# Patient Record
Sex: FEMALE | Race: WHITE | Employment: OTHER | URBAN - METROPOLITAN AREA
[De-identification: names, ages, dates, MRNs, and addresses within clinical notes are randomized per-mention and may not be internally consistent; named-entity substitution may affect disease eponyms.]

---

## 2019-03-04 ENCOUNTER — HOME HEALTH ADMISSION (OUTPATIENT)
Dept: HOME HEALTH SERVICES | Facility: HOME HEALTH | Age: 69
End: 2019-03-04

## 2019-03-05 ENCOUNTER — PATIENT OUTREACH (OUTPATIENT)
Dept: CASE MANAGEMENT | Age: 69
End: 2019-03-05

## 2019-03-05 NOTE — PROGRESS NOTES
Outreach to home number x 2 for CCM related to new referrals to [facility], [facility], DSME center, A1C >11%. No answer; left voice message requesting return call.

## 2019-03-22 ENCOUNTER — PATIENT OUTREACH (OUTPATIENT)
Dept: CASE MANAGEMENT | Age: 69
End: 2019-03-22

## 2019-03-22 ENCOUNTER — HOME HEALTH ADMISSION (OUTPATIENT)
Dept: HOME HEALTH SERVICES | Facility: HOME HEALTH | Age: 69
End: 2019-03-22
Payer: MEDICARE

## 2019-03-22 NOTE — PROGRESS NOTES
CCM outreach to home/cell number. Answered by patient, but then unable to communicate. RN could hear patient on the phone but no response to questions. Patient returned call to RN CM. Note  Utilize questions pertinent to patient Referral Source & Reason Multiple referrals Primary concerns per patient and/or pts family/caregiver Depression Recent Hospitalization(s)/ED Visits No  
Current with Home Health? 
- Agency? NO, but has order. Social Needs: - Able to afford medications? - Financial assessment? 
- Access to care? Transportation? NO concerns Nutritional Assessment - Appetite? - Obesity? 
- Failure to Thrive? - Bowels ? Appetite poor, but eating to need. Bowels no issues, diarrhea decreased lately Cognitive Assessment 
- History of dementia 
- Health literacy Health literacy concerns Mobility/Activity Assessment - Bed/chair bound? - Make use of assistive devices? - Does patient still drive? Home bound, does drive self just to provider appointments. Plan/Interventions/Education - Follow up Appointments - Referrals DSME, coordinate referrals. Note sent to PCP office to request [facility] referral be resent. Patient has phone issues, but it is working now. She will call ENT and Dietitian to schedule visits. Has Endo visit scheduled. She is home bound but able to drive self to provider appointments.

## 2019-03-22 NOTE — Clinical Note
CHIP Gambino. Birtha Generous, can you please send the referral for home health back to [facility]. They closed it when they couldn't reach her. Her phone was not working, but seems to be okay now.

## 2019-03-24 ENCOUNTER — HOME CARE VISIT (OUTPATIENT)
Dept: SCHEDULING | Facility: HOME HEALTH | Age: 69
End: 2019-03-24
Payer: MEDICARE

## 2019-03-24 VITALS
TEMPERATURE: 97.8 F | BODY MASS INDEX: 20.2 KG/M2 | DIASTOLIC BLOOD PRESSURE: 78 MMHG | OXYGEN SATURATION: 98 % | HEIGHT: 63 IN | WEIGHT: 114 LBS | HEART RATE: 67 BPM | SYSTOLIC BLOOD PRESSURE: 140 MMHG | RESPIRATION RATE: 20 BRPM

## 2019-03-24 PROCEDURE — G0299 HHS/HOSPICE OF RN EA 15 MIN: HCPCS

## 2019-03-24 PROCEDURE — 3331090001 HH PPS REVENUE CREDIT

## 2019-03-24 PROCEDURE — 400013 HH SOC

## 2019-03-24 PROCEDURE — 3331090002 HH PPS REVENUE DEBIT

## 2019-03-25 PROCEDURE — 3331090001 HH PPS REVENUE CREDIT

## 2019-03-25 PROCEDURE — 3331090002 HH PPS REVENUE DEBIT

## 2019-03-26 PROCEDURE — 3331090001 HH PPS REVENUE CREDIT

## 2019-03-26 PROCEDURE — 3331090002 HH PPS REVENUE DEBIT

## 2019-03-27 PROCEDURE — 3331090002 HH PPS REVENUE DEBIT

## 2019-03-27 PROCEDURE — 3331090001 HH PPS REVENUE CREDIT

## 2019-03-28 ENCOUNTER — HOME CARE VISIT (OUTPATIENT)
Dept: SCHEDULING | Facility: HOME HEALTH | Age: 69
End: 2019-03-28
Payer: MEDICARE

## 2019-03-28 PROCEDURE — 3331090002 HH PPS REVENUE DEBIT

## 2019-03-28 PROCEDURE — 3331090001 HH PPS REVENUE CREDIT

## 2019-03-29 PROCEDURE — 3331090001 HH PPS REVENUE CREDIT

## 2019-03-29 PROCEDURE — 3331090002 HH PPS REVENUE DEBIT

## 2019-03-30 PROCEDURE — 3331090002 HH PPS REVENUE DEBIT

## 2019-03-30 PROCEDURE — 3331090001 HH PPS REVENUE CREDIT

## 2019-03-31 PROCEDURE — 3331090001 HH PPS REVENUE CREDIT

## 2019-03-31 PROCEDURE — 3331090002 HH PPS REVENUE DEBIT

## 2019-04-01 ENCOUNTER — HOME CARE VISIT (OUTPATIENT)
Dept: SCHEDULING | Facility: HOME HEALTH | Age: 69
End: 2019-04-01
Payer: MEDICARE

## 2019-04-01 ENCOUNTER — PATIENT OUTREACH (OUTPATIENT)
Dept: CASE MANAGEMENT | Age: 69
End: 2019-04-01

## 2019-04-01 VITALS
HEART RATE: 73 BPM | TEMPERATURE: 97.7 F | OXYGEN SATURATION: 98 % | RESPIRATION RATE: 20 BRPM | SYSTOLIC BLOOD PRESSURE: 130 MMHG | DIASTOLIC BLOOD PRESSURE: 80 MMHG

## 2019-04-01 PROCEDURE — G0299 HHS/HOSPICE OF RN EA 15 MIN: HCPCS

## 2019-04-01 PROCEDURE — 3331090002 HH PPS REVENUE DEBIT

## 2019-04-01 PROCEDURE — 3331090001 HH PPS REVENUE CREDIT

## 2019-04-01 PROCEDURE — 3331090003 HH PPS REVENUE ADJ

## 2019-04-01 NOTE — PROGRESS NOTES
Attempted CCM outreach; phone went to message \"voice mailbox not set up. \"  was also unable to reach patient for last scheduled visit. Admission visit was completed.

## 2019-04-02 PROCEDURE — 3331090001 HH PPS REVENUE CREDIT

## 2019-04-02 PROCEDURE — 3331090002 HH PPS REVENUE DEBIT

## 2019-04-03 PROCEDURE — 3331090002 HH PPS REVENUE DEBIT

## 2019-04-03 PROCEDURE — 3331090001 HH PPS REVENUE CREDIT

## 2019-04-04 ENCOUNTER — HOME CARE VISIT (OUTPATIENT)
Dept: SCHEDULING | Facility: HOME HEALTH | Age: 69
End: 2019-04-04
Payer: MEDICARE

## 2019-04-04 PROCEDURE — 3331090001 HH PPS REVENUE CREDIT

## 2019-04-04 PROCEDURE — 3331090002 HH PPS REVENUE DEBIT

## 2019-04-05 ENCOUNTER — PATIENT OUTREACH (OUTPATIENT)
Dept: CASE MANAGEMENT | Age: 69
End: 2019-04-05

## 2019-04-05 PROCEDURE — 3331090002 HH PPS REVENUE DEBIT

## 2019-04-05 PROCEDURE — 3331090001 HH PPS REVENUE CREDIT

## 2019-04-06 PROCEDURE — 3331090002 HH PPS REVENUE DEBIT

## 2019-04-06 PROCEDURE — 3331090001 HH PPS REVENUE CREDIT

## 2019-04-07 PROCEDURE — 3331090002 HH PPS REVENUE DEBIT

## 2019-04-07 PROCEDURE — 3331090001 HH PPS REVENUE CREDIT

## 2019-04-08 PROCEDURE — 3331090002 HH PPS REVENUE DEBIT

## 2019-04-08 PROCEDURE — 3331090001 HH PPS REVENUE CREDIT

## 2019-04-09 ENCOUNTER — HOME CARE VISIT (OUTPATIENT)
Dept: HOME HEALTH SERVICES | Facility: HOME HEALTH | Age: 69
End: 2019-04-09
Payer: MEDICARE

## 2019-04-09 PROCEDURE — 3331090001 HH PPS REVENUE CREDIT

## 2019-04-09 PROCEDURE — 3331090002 HH PPS REVENUE DEBIT

## 2019-04-10 PROCEDURE — 3331090001 HH PPS REVENUE CREDIT

## 2019-04-10 PROCEDURE — 3331090002 HH PPS REVENUE DEBIT

## 2019-04-11 ENCOUNTER — HOME CARE VISIT (OUTPATIENT)
Dept: SCHEDULING | Facility: HOME HEALTH | Age: 69
End: 2019-04-11

## 2019-04-11 ENCOUNTER — HOME CARE VISIT (OUTPATIENT)
Dept: HOME HEALTH SERVICES | Facility: HOME HEALTH | Age: 69
End: 2019-04-11
Payer: MEDICARE

## 2019-04-11 PROCEDURE — 3331090001 HH PPS REVENUE CREDIT

## 2019-04-11 PROCEDURE — 3331090002 HH PPS REVENUE DEBIT

## 2019-04-12 ENCOUNTER — HOME CARE VISIT (OUTPATIENT)
Dept: HOME HEALTH SERVICES | Facility: HOME HEALTH | Age: 69
End: 2019-04-12
Payer: MEDICARE

## 2019-04-19 ENCOUNTER — PATIENT OUTREACH (OUTPATIENT)
Dept: CASE MANAGEMENT | Age: 69
End: 2019-04-19

## 2019-04-19 NOTE — PROGRESS NOTES
Outreach to cell number, answered by Mrs. Tanika Shepherd. She reports she has a new phone but is still having problems with receiving calls. 1. HH has discharged. 2. New PCP: she has decided to use Dr. Stefano Maldonado. Jessica Mckeon. She will call to make an appointment. 3. She picked up a new glucometer, but instead of getting low cost Relion, she got Contour. She is hesitant to purchase more strips. She does have part D coverage but is having difficulty getting her new plan to cover. 4. She agrees to SMBG fasting and hs, which is an increase from every other day, which she was doing. 5. Given directions to her Endo appointment scheduled for 4/22. 6. She still needs to schedule appointments with dietitian and ENT. Will work on after endo and new PCP visits. 7. Long discussion on grief post 's death (one year ago). Encouraged seeing grief counselor or Mental health. She will think it over. Plan: 
Continue to follow for CCM. Coordinate referrals as above. DSME.

## 2019-04-26 ENCOUNTER — PATIENT OUTREACH (OUTPATIENT)
Dept: CASE MANAGEMENT | Age: 69
End: 2019-04-26

## 2019-04-26 NOTE — PROGRESS NOTES
CCM outreach; no answer. No endo note from scheduled visit for 4/22. Plan to attempt outreach one week.

## 2019-05-07 ENCOUNTER — HOME CARE VISIT (OUTPATIENT)
Dept: HOME HEALTH SERVICES | Facility: HOME HEALTH | Age: 69
End: 2019-05-07
Payer: MEDICARE

## 2019-05-07 ENCOUNTER — PATIENT OUTREACH (OUTPATIENT)
Dept: CASE MANAGEMENT | Age: 69
End: 2019-05-07

## 2019-05-14 ENCOUNTER — PATIENT OUTREACH (OUTPATIENT)
Dept: CASE MANAGEMENT | Age: 69
End: 2019-05-14

## 2019-05-21 ENCOUNTER — PATIENT OUTREACH (OUTPATIENT)
Dept: CASE MANAGEMENT | Age: 69
End: 2019-05-21

## 2019-05-21 NOTE — PROGRESS NOTES
Outreach to home/cell; no answer. Ms. Azeem Putnam has scheduled an appointment for 5/28 with new PCP. Plan: f/u after PCP appointment to see if referrals made. D/C possible.

## 2019-05-29 PROBLEM — F32.A DEPRESSION: Status: ACTIVE | Noted: 2019-05-29

## 2019-06-03 ENCOUNTER — PATIENT OUTREACH (OUTPATIENT)
Dept: CASE MANAGEMENT | Age: 69
End: 2019-06-03

## 2019-06-03 NOTE — PROGRESS NOTES
CCM outreach; no answer. Ms. Jason Medel has been difficult to reach. She did attend her new PCP scheduled visit. She also has a referral to endo. CCM episode closed due to inability to reach patient for engagement.

## 2019-07-23 ENCOUNTER — HOSPITAL ENCOUNTER (OUTPATIENT)
Dept: GENERAL RADIOLOGY | Age: 69
Discharge: HOME | End: 2019-07-23
Attending: INTERNAL MEDICINE
Payer: MEDICARE

## 2019-07-23 DIAGNOSIS — R11.2 NAUSEA AND VOMITING: ICD-10-CM

## 2019-07-23 DIAGNOSIS — K21.9 GERD (GASTROESOPHAGEAL REFLUX DISEASE): ICD-10-CM

## 2019-07-23 DIAGNOSIS — R13.10 DYSPHAGIA: ICD-10-CM

## 2019-07-23 PROCEDURE — 74011000255 HC RX REV CODE- 255: Performed by: INTERNAL MEDICINE

## 2019-07-23 PROCEDURE — 74011000250 HC RX REV CODE- 250: Performed by: INTERNAL MEDICINE

## 2019-07-23 PROCEDURE — 74220 X-RAY XM ESOPHAGUS 1CNTRST: CPT

## 2019-07-23 RX ADMIN — BARIUM SULFATE 135 ML: 980 POWDER, FOR SUSPENSION ORAL at 11:06

## 2019-07-23 RX ADMIN — BARIUM SULFATE 355 ML: 0.6 SUSPENSION ORAL at 11:12

## 2019-07-23 RX ADMIN — ANTACID/ANTIFLATULENT 4 G: 380; 550; 10; 10 GRANULE, EFFERVESCENT ORAL at 11:05

## 2019-09-05 ENCOUNTER — ANESTHESIA EVENT (OUTPATIENT)
Dept: ENDOSCOPY | Age: 69
End: 2019-09-05
Payer: MEDICARE

## 2019-09-05 RX ORDER — SODIUM CHLORIDE, SODIUM LACTATE, POTASSIUM CHLORIDE, CALCIUM CHLORIDE 600; 310; 30; 20 MG/100ML; MG/100ML; MG/100ML; MG/100ML
100 INJECTION, SOLUTION INTRAVENOUS CONTINUOUS
Status: CANCELLED | OUTPATIENT
Start: 2019-09-05

## 2019-09-06 ENCOUNTER — HOSPITAL ENCOUNTER (OUTPATIENT)
Age: 69
Setting detail: OUTPATIENT SURGERY
Discharge: HOME | End: 2019-09-06
Attending: INTERNAL MEDICINE | Admitting: INTERNAL MEDICINE
Payer: MEDICARE

## 2019-09-06 ENCOUNTER — ANESTHESIA (OUTPATIENT)
Dept: ENDOSCOPY | Age: 69
End: 2019-09-06
Payer: MEDICARE

## 2019-09-06 VITALS
TEMPERATURE: 98.6 F | WEIGHT: 111 LBS | RESPIRATION RATE: 20 BRPM | OXYGEN SATURATION: 100 % | DIASTOLIC BLOOD PRESSURE: 74 MMHG | HEART RATE: 61 BPM | BODY MASS INDEX: 19.67 KG/M2 | HEIGHT: 63 IN | SYSTOLIC BLOOD PRESSURE: 180 MMHG

## 2019-09-06 LAB — GLUCOSE BLD STRIP.AUTO-MCNC: 94 MG/DL (ref 65–100)

## 2019-09-06 PROCEDURE — 88305 TISSUE EXAM BY PATHOLOGIST: CPT

## 2019-09-06 PROCEDURE — 76060000031 HC ANESTHESIA FIRST 0.5 HR: Performed by: INTERNAL MEDICINE

## 2019-09-06 PROCEDURE — 77030021593 HC FCPS BIOP ENDOSC BSC -A: Performed by: INTERNAL MEDICINE

## 2019-09-06 PROCEDURE — 74011250636 HC RX REV CODE- 250/636: Performed by: ANESTHESIOLOGY

## 2019-09-06 PROCEDURE — 82962 GLUCOSE BLOOD TEST: CPT

## 2019-09-06 PROCEDURE — 74011250636 HC RX REV CODE- 250/636

## 2019-09-06 PROCEDURE — 76040000025: Performed by: INTERNAL MEDICINE

## 2019-09-06 RX ORDER — PROPOFOL 10 MG/ML
INJECTION, EMULSION INTRAVENOUS AS NEEDED
Status: DISCONTINUED | OUTPATIENT
Start: 2019-09-06 | End: 2019-09-06 | Stop reason: HOSPADM

## 2019-09-06 RX ORDER — SODIUM CHLORIDE, SODIUM LACTATE, POTASSIUM CHLORIDE, CALCIUM CHLORIDE 600; 310; 30; 20 MG/100ML; MG/100ML; MG/100ML; MG/100ML
100 INJECTION, SOLUTION INTRAVENOUS CONTINUOUS
Status: DISCONTINUED | OUTPATIENT
Start: 2019-09-06 | End: 2019-09-06 | Stop reason: HOSPADM

## 2019-09-06 RX ORDER — LIDOCAINE HYDROCHLORIDE 20 MG/ML
INJECTION, SOLUTION EPIDURAL; INFILTRATION; INTRACAUDAL; PERINEURAL AS NEEDED
Status: DISCONTINUED | OUTPATIENT
Start: 2019-09-06 | End: 2019-09-06 | Stop reason: HOSPADM

## 2019-09-06 RX ADMIN — PROPOFOL 50 MG: 10 INJECTION, EMULSION INTRAVENOUS at 11:00

## 2019-09-06 RX ADMIN — PROPOFOL 20 MG: 10 INJECTION, EMULSION INTRAVENOUS at 11:11

## 2019-09-06 RX ADMIN — PROPOFOL 20 MG: 10 INJECTION, EMULSION INTRAVENOUS at 11:04

## 2019-09-06 RX ADMIN — SODIUM CHLORIDE, SODIUM LACTATE, POTASSIUM CHLORIDE, AND CALCIUM CHLORIDE 100 ML/HR: 600; 310; 30; 20 INJECTION, SOLUTION INTRAVENOUS at 10:35

## 2019-09-06 RX ADMIN — LIDOCAINE HYDROCHLORIDE 40 MG: 20 INJECTION, SOLUTION EPIDURAL; INFILTRATION; INTRACAUDAL; PERINEURAL at 11:00

## 2019-09-06 RX ADMIN — PROPOFOL 20 MG: 10 INJECTION, EMULSION INTRAVENOUS at 11:06

## 2019-09-06 NOTE — ANESTHESIA POSTPROCEDURE EVALUATION
Procedure(s):  ESOPHAGOGASTRODUODENOSCOPY (EGD) WITH POSS DILATION/ 21  ESOPHAGOGASTRODUODENAL (EGD) BIOPSY.     total IV anesthesia    Anesthesia Post Evaluation        Patient location during evaluation: PACU  Patient participation: complete - patient participated  Level of consciousness: awake and alert  Pain management: adequate  Airway patency: patent  Anesthetic complications: no  Cardiovascular status: acceptable  Respiratory status: acceptable  Hydration status: acceptable  Post anesthesia nausea and vomiting:  none      Vitals Value Taken Time   /74 9/6/2019 11:55 AM   Temp 37 °C (98.6 °F) 9/6/2019 11:21 AM   Pulse 61 9/6/2019 11:55 AM   Resp 20 9/6/2019 11:55 AM   SpO2 100 % 9/6/2019 11:54 AM

## 2019-09-06 NOTE — DISCHARGE INSTRUCTIONS
Gastrointestinal Esophagogastroduodenoscopy (EGD) - Upper Exam Discharge Instructions    1. Call Dr. Bogdan Chopra at [phone] for any problems or questions. 2. Contact the doctor's office for follow up appointment as directed. 3. Medication may cause drowsiness for several hours, therefore:  · Do not drive or operate machinery for remainder of the day. · No alcohol today. · Do not make any important or legal decisions for 24 hours. · Do not sign any legal documents for 24 hours. 5. Ordinarily, you may resume regular diet and activity after exam unless otherwise specified by your physician. 6. For mild soreness in your throat you may use Cepacol throat lozenges or warm salt-water gargles as needed. Any additional instructions:  Liquid diet today and increase to soft food pureed food as tolerated, resume all your medications, restart Plavix tomorrow, start Pepcid twice a day by mouth, office will call you with follow up appointment or call office in two weeks to schedule a follow up appointment and for biopsy results. Instructions given to Merry Bala and other family members.

## 2019-09-06 NOTE — ANESTHESIA PREPROCEDURE EVALUATION
Anesthetic History   No history of anesthetic complications            Review of Systems / Medical History  Patient summary reviewed and pertinent labs reviewed    Pulmonary    COPD        Asthma : well controlled       Neuro/Psych         TIA and psychiatric history     Cardiovascular    Hypertension        Dysrhythmias : atrial fibrillation  CAD and cardiac stents    Exercise tolerance: >4 METS     GI/Hepatic/Renal     GERD    Renal disease: CRI  Liver disease (liver transplant)     Endo/Other    Diabetes: well controlled, type 2         Other Findings              Physical Exam    Airway  Mallampati: I  TM Distance: 4 - 6 cm  Neck ROM: normal range of motion   Mouth opening: Normal     Cardiovascular    Rhythm: regular  Rate: normal         Dental    Dentition: Poor dentition  Comments: Upper implants.  Very poor dentition on lowers with some loose teeth in front   Pulmonary  Breath sounds clear to auscultation               Abdominal         Other Findings            Anesthetic Plan    ASA: 3  Anesthesia type: total IV anesthesia          Induction: Intravenous  Anesthetic plan and risks discussed with: Patient

## 2019-09-06 NOTE — H&P
Gastroenterology Outpatient History and Physical    Patient: [facility]    Physician: Heaven Singh MD    Vital Signs: Blood pressure 180/77, pulse 60, temperature 97.5 °F (36.4 °C), resp. rate 14, height 5' 3\" (1.6 m), weight 50.3 kg (111 lb), SpO2 100 %, not currently breastfeeding. Allergies: Allergies   Allergen Reactions    Adhesive Swelling     At site-local- can use paper tape    Bactroban [Mupirocin Calcium] Unknown (comments)    Benadryl [Diphenhydramine Hcl] Hives    Iodinated Contrast Media Shortness of Breath    Iohexol Unknown (comments)    Macrobid [Nitrofurantoin Monohyd/M-Cryst] Unknown (comments)    Metoclopramide Hcl Other (comments)    Other Medication Other (comments)     Tape \"takes my skin off\", epidural that patient had when hospitalized in March 2015    Reglan [Metoclopramide] Other (comments)     Mouth twitches       Chief Complaint: Dysphagia, Nausea, GERD    History of Present Illness: No changes since recent OV - see below. Barium swallow revealed segmental stricturing of the proximal esophagus. Justification for Procedure: Evaluate and possibly dilate stricture.      History:  Past Medical History:   Diagnosis Date    A-fib ([facility]     Adverse effect of anesthesia     overly sedated with epidural with surgery march- 2015, swelling, difficult awakening    Anemia     Asthma     B12 deficiency     Bone/cartilage disorder     CAD (coronary artery disease)     cardiac stents x 3    Cervical cancer (HCC)     Chronic ischemic heart disease     Chronic kidney disease     small ureters, CKF    Chronic nausea     Chronic pain     back    Chronic UTI     Cirrhosis (HCC)     Cirrhosis (HCC)     CKD (chronic kidney disease)     Coagulation disorder (HCC)     thylocemia b-minor    COPD (chronic obstructive pulmonary disease) (HCC)     Diabetes ([facility] [address] [facility]     avg-150-170,hypo symptoms in 80s    GERD (gastroesophageal reflux disease)     managed with medications  Glaucoma     Hemochromatosis carrier     Hernia     Hyperlipidemia     Hypertension     Hypomagnesemia     IBS (irritable bowel syndrome)     Incontinence of urine in female     Liver transplanted ([facility] [address] [facility] 2001    Lumbago     Osteoporosis     PUD (peptic ulcer disease)     Restless leg syndrome     Splenomegaly     Stroke ([facility] [facility]     no residual/pt denies states had a micro-stroke with memory loss    Ventral hernia, recurrent       Past Surgical History:   Procedure Laterality Date    CARDIAC SURG PROCEDURE UNLIST      3 stents    HX APPENDECTOMY      HX CATARACT REMOVAL Bilateral     HX HERNIA REPAIR Right     total of 6    HX HYSTERECTOMY      HX LIVER TRANSPLANT      HX OPEN CHOLECYSTECTOMY      HX OTHER SURGICAL  07/2013    basal cell carcinoma excision    HX TOTAL ABDOMINAL HYSTERECTOMY      HX TRANSPLANT  2001    Liver transplant      Social History     Socioeconomic History    Marital status:      Spouse name: Not on file    Number of children: Not on file    Years of education: Not on file    Highest education level: Not on file   Tobacco Use    Smoking status: Never Smoker    Smokeless tobacco: Never Used   Substance and Sexual Activity    Alcohol use: No    Drug use: No      Family History   Problem Relation Age of Onset    Cancer Mother         mouth    Liver Disease Brother     Diabetes Sister     Arthritis-osteo Sister     Heart Disease Brother        Medications:   Prior to Admission medications    Medication Sig Start Date End Date Taking? Authorizing Provider   lubiPROStone (AMITIZA) 8 mcg capsule Take  by mouth daily as needed for Constipation. Yes Provider, Historical   mycophenolate sodium (MYFORTIC) 360 mg delayed release tablet Take  by mouth two (2) times a day. Take 2 tab twice a day   Indications: Liver Transplant   Yes Provider, Historical   tolterodine (DETROL) 2 mg tablet Take 2 mg by mouth nightly.    Yes Provider, Historical   insulin degludec (TRESIBA FLEXTOUCH U-100) 100 unit/mL (3 mL) inpn INJECT 18 UNITS DAILY - BEDTIME  Indications: type 2 diabetes mellitus 8/15/19  Yes Samuel Galindo MD   ondansetron hcl (ZOFRAN) 8 mg tablet TAKE 1 TABLET BY MOUTH EVERY 8 HOURS AS NEEDED FOR NAUSEA 8/10/19  Yes Samuel Galindo MD   clopidogrel (PLAVIX) 75 mg tab TAKE 1 TABLET BY MOUTH EVERY MORNING 8/8/19  Yes Samuel Galindo MD   metoprolol tartrate (LOPRESSOR) 50 mg tablet TAKE 1 TABLET BY MOUTH THREE TIMES A DAY 8/8/19  Yes Samuel Galindo MD   fenofibrate micronized (LOFIBRA) 134 mg capsule TAKE 1 CAPSULE BY MOUTH NIGHTLY 8/8/19  Yes Samuel Galindo MD   HORIZANT 600 mg TbER daily (after dinner). 5/9/19  Yes Provider, Historical   insulin aspart U-100 (NOVOLOG) 100 unit/mL (3 mL) inpn 10-12 Units by SubCUTAneous route Before breakfast, lunch, and dinner. Yes Provider, Historical   nitroglycerin (NITRO-DUR) 0.6 mg/hr pt24 1 Patch by TransDERmal route daily. Yes Provider, Historical   oxyCODONE IR (ROXICODONE) 10 mg tab immediate release tablet Take 10 mg by mouth two (2) times a day. Yes Provider, Historical   folic acid (FOLVITE) 1 mg tablet TAKE 1 TABLET BY MOUTH ONCE DAILY 3/20/19  Yes Randell Cifuentes MD   aspirin (ASPIRIN) 325 mg tablet Take 325 mg by mouth daily. Yes Provider, Historical   alcaftadine (LASTACAFT) 0.25 % drop nightly. 7/6/15   Provider, Historical   promethazine (PHENERGAN) 25 mg suppository  3/23/15   Provider, Historical   pravastatin (PRAVACHOL) 20 mg tablet TAKE 1 TABLET BY MOUTH NIGHTLY 7/9/19   Samuel Galindo MD   bimatoprost (LUMIGAN) 0.01 % ophthalmic drops Administer 1 Drop to both eyes nightly. Provider, Historical   sodium bicarbonate 325 mg tablet TAKE 2 TABLETS BY MOUTH THREE TIMES A DAY 11/18/18   Randell Cifuentes MD   PROAIR HFA 90 mcg/actuation inhaler INHALE 2 PUFFS BY MOUTH EVERY 4 HOURS 9/13/17   Randell Cifuentes MD   DARBEPOETIN KACI IN ALBUMN SOL (ARANESP IJ) by Injection route. One q 2 weeks - managed by [address] [place] [address]. Provider, Historical       Physical Exam:   General: alert, cooperative, no distress   HEENT: Head: Normocephalic, no lesions, without obvious abnormality. Heart: regular rate and rhythm, S1, S2 normal, no murmur, click, rub or gallop   Lungs: chest clear, no wheezing, rales, normal symmetric air entry   Abdominal: Bowel sounds are normal, liver is not enlarged, spleen is not enlarged   Neurological: Grossly normal   Extremities: no edema     Findings/Diagnosis: Dysphagia, Nausea, GERD, Abnormal Ba swallow. Plan of Care/Planned Procedure: EGD + Dilation    Kiarra Guerrero MD        >      Patient:   Jennifer Kat  YOB: 1950   Date:                        07/22/2019 11:00 AM   Visit Type:                  Office Visit  Provider:   John Leigh  Referring Provider:  Lise Gitelman MD [facility] Gastroenterology Hepatology Liver  Primary Care Provider: April Mattson MD [address]      This 68year old  patient was referred by Lise Gitelman MD.  This 68year old female presents for dysphagia and nausea. History of Present Illness:  1.  dysphagia, nausea   68year old established female patient of Dr. Femi Gong S/P Liver transplant who returns to the office today with complaint of choking. She is accompanied by Earla Crigler, her daughter for today's office visit. She was last seen in our office on 2/17/16 by Dr. Femi Gong with improved symptoms on omeprazole and ranitidine. A followup was advised in 4 months but patient did not return for follow up. Her last EGD was in 2015 performed by Dr. Raleigh Campbell which showed bile gastritis. Patient states that she has again been sick since 12/2018. She was seen by her PCP, Dr. Nan Tomlinson for annual physical one month ago. She reports that her nausea is daily and constant. Her vomiting occurs occasionally. The vomitus is \"foamy. \" Her most recent episode was last night.  She also complains of solid food and liquid dysphagia. The liquids are worse. She does complain of weight loss. She has lost 6# since her last office visit in 2016. She is currently taking Protonix for upper GI symptoms. She is tolerating this medicine well. She has sporadic diarrhea with watery stools. When the diarrhea subsides, she has loose stools. She states this has been going on for approximately 19yrs. She does have Amitiza. She is taking this as needed for constipation, which occurs about once monthly. She has not tried any other medications for her constipation. She also reports a low -grade fever at times. She has bilateral suprapubic pain. She reports that she has an UTI. She took azo and states this has provided relief. No further pertinent GI complaints or concerns noted. She remains on clopidogrel therapy. She is scheduled for teeth extractions. She has been holding her Plavix for 10-14 days for the dental work. She reports that she would like to proceed with endoscopic evaluation while off her anticoagulant therapy. Her cardiologist, Dr. Selena Green is managing this medication. She is due for follow up with [address] transplant center. She reports that she tends to have labs ordered by [address]. She states she was due four months ago. Her labs are usually obtained at [facility] or [facility]. These will be obtained for review.     Past Medical/Surgical History:   (Detailed)  Disease/disorder Onset Date Management Date Comments     Appendectomy  AWN 10/05/2015 -     Cholecystectomy  AWN 10/05/2015 -     Cataract surgery  AWN 10/05/2015 -     Hysterectomy with Bilateral salpingo-oophorectomy  AWN 10/05/2015 -     Right incisional herniorrhaphy  AWN 10/05/2015 -    Ischemic heart disease/valvular disease dilated cardiomyopathy    AWN 10/05/2015 -   Anemia/Beta thalassemia minor    AWN 10/05/2015 -   Anxiety/Depression    AWN 10/05/2015 -   Asthma    AWN 10/05/2015 -   CAD with prior cardiac stent    AWN 10/05/2015 -   Chronic abdominal wound with previous hernia surgery, recurrent ventral hernia repair 3/2015    AWN 10/05/2015 -   CKD    AWN 10/05/2015 -   DM    AWN 10/05/2015 -   Dyslipidemia    AWN 10/05/2015 -   HTN    AWN 10/05/2015 -   Hx colon polyps    AWN 10/05/2015 -   Hx ETOH Cirrhosis with hx ascites s/p transplant 10/2001    AWN 10/05/2015 -   Hx gastric polyps    AWN 10/05/2015 -   Hx Hiatal hernia    AWN 10/05/2015 -   Hypothyroidism    AWN 10/05/2015 -   IBS    AWN 10/05/2015 -   Osteoporosis    AWN 10/05/2015 -   Vit D deficiency    AWN 10/05/2015 -     Additional Medical History  HTN  AODM  CKD  HLD  CAD ff by Dr Zuleima Singh of [facility]  COPD  RLS  OAB  Osteoporosis     Has hx of blood transfusions. Additional Surgical History  Liver transplant  at [address]   Multiple surgeries for ventral hernia after liver tx - also at [address]. Procedure History  : EGD, small hiatal hernia, otherwise normal  : EGD, small hiatal hernia, multiple tiny gastric polyps  : Colonoscopy, 2 polyps removed (TA'S), EH    EGD 3/12/09 multiple small fundic gland gastric polyps and was o/w normal.  Neg for h.pylori. Duodenal bx benign. Colonoscopy 3/12/09 AV malformation of transverse colon and small external hemorrhoids. 12/02/15: EGD, bile gastritis; bxs., Duodenum, negative; gastric, benign with features of repair, negative for H. pylori    Family History:  (Detailed)  Relationship Family Member Name  Age at Death Condition Onset Age Cause of Death   Brother  Y  Cirrhosis of unknown etiology  Y   Mother    Cirrhosis, unknown etiology  N   Mother    Oral cancer  N     Additional Family History  + for oral and female  Cancer    Social History:  (Detailed)  No hx ETOH. No tobacco use. No NSAID use. +Hx blood transfusions with hx chronic anemia last 10yrs ago. . 3 children. Tobacco use reviewed. Preferred language is [place].     Education/Employment/Occupation:  Employment History Status Retired Restrictions     disabled     MARITAL STATUS/FAMILY/SOCIAL SUPPORT  Currently . Previously   CHILDREN  Has children:  Tobacco use status: Never smoked tobacco.  Smoking status: Never smoker. SMOKING STATUS  Type Smoking Status Usage Per Day Years Used Total Pack Years    Never smoker        ALCOHOL  There is no history of alcohol use. CAFFEINE  The patient uses caffeine: tea - 2 cups a day. HOME ENVIRONMENT/SAFETY  The patient has fallen 2 times in the last year. COMMENTS  No history of IVDA, tattoos, piercings, hepatitis A/B vaccination, or defibrillator. Current Medications:  Medication Name Sig Desc   tolterodine 2 mg tablet take 1 tablet by oral route  every day   Tresiba U-100 Insulin 100 unit/mL subcutaneous solution inject by subcutaneous route as per insulin protocol   pravastatin 20 mg tablet take 1 Tablet by oral route  every day   Amitiza 8 mcg capsule take 1 capsule by oral route 2 times every day with food and water   Lumigan 0.01 % eye drops take as directed   Ventolin HFA 90 mcg/actuation aerosol inhaler inhale 2 puff by inhalation route  every 4 - 6 hours as needed   Novolog Flexpen U-100 Insulin aspart 100 unit/mL (3 mL) subcutaneous inject by subcutaneous route per prescriber's instructions. Insulin dosing requires individualization. omeprazole 40 mg capsule,delayed release take 1 capsule by oral route  at bedtime   Zofran 8 mg tablet 1 table orally every 4-6 Hours, As Needed   metoprolol tartrate 25 mg tablet take 1 tablet by oral route 3 times every day   FENOFIBRATE MICRONIZED ORAL CAPSULE CONVENTIONAL 134 MG 1 Every Day   FOLIC ACID ORAL TABLET 1 MG 1 Every Day   NITRO PATCH 6 mg as directed. MYFORTIC ORAL TABLET ENTERIC COATED 360 MG 2 Two Times A Day   SODIUM BICARBONATE ORAL POWDER 1 Every Day   PLAVIX ORAL TABLET 75 MG 1 Every Day   VITAMIN B COMPLEX INJECTION INJECTION B 12 injection [id number] once weekly. Allergies:  Ingredient Reaction (Severity) Medication Name Comment   ADHESIVE   ADHESIVES. DIPHENHYDRAMINE HCL   BENADRYL. Legacy   EPIDURAL-DIFFICULTY BREATHIN   METOCLOPRAMIDE HCL   REGLAN. Reviewed, no changes. Review of Systems:  System Neg/Pos Details   Constitutional Positive Fatigue, Fever. Eyes Positive Vision changes. Respiratory Positive Dyspnea. Cardio Positive Chest pain. GI Positive Abdominal pain, Diarrhea, Dysphagia, Nausea, Reflux, Vomiting.  Positive Dysuria, Urinary difficulty. Endocrine Positive Cold intolerance, Heat intolerance. Neuro Positive Dizziness. MS Positive Joint pain, Myalgia. Hema/Lymph Positive Anemia. An 11-point review of systems was negative except as mentioned in the HPI and Past Medical History. Vital Signs:  BP mm/Hg Pulse Resp Pulse Ox Temp F Ht (Total in.) Weight (lbs.) Weight (oz.) BMI   118/70 85 16   63.50 111.00  19.35     Physical Exam:  CONST:  NAD. WD, lean WF who appears stated age. EYES: Conjunctiva pink. Sclerae non-icteric. NECK: Symmetrical with no obvious masses. No JVD. RESP:  Normal respiratory effort. Normal to auscultation. CVS: RRR with no g /m/ r. No Carotid bruits. No edema noted. GI: RUQ surgical scar. Large ventral hernia. Symmetrical, non-distended abdomen. BS WNL. No masses. No tenderness. No g/r. SKIN: No significant petechiae, bruises or spider angioma. MSK: Normal movements of extremities. Unsteady gait. Patient in wheelchair. NEURO: Oriented to person, place, and general circumstances. Recent and remote memory grossly intact. Able to give personal history. PSYCH:  Mood and affect appropriate. Judgement is intact. Labs/X-Rays:  6/5/19 CBC with hemoglobin 11.2 MCV 63 platelets 233,453. CMP with elevated glucose 146, low total protein of 5.6, normal albumin of 3.6, elevated alk phos of 212, elevated  and elevated  - otherwise unremarkable. Assessment/Plan:  # Detail Type Description    1. Assessment Other dysphagia (R13.19).     Provider Plan Diagnostic considerations are reviewed. Liquid dysphagia suggests possible motility disorder-oropharyngeal or esophageal. We will first, schedule barium swallow with tablet. We will then proceed with upper endoscopy in a hospital-based setting. The risks of the procedure were discussed with the patient including bleeding, perforation, missed pathology, and complications of anesthesia. Patient was offered the option to review an information video on the procedure. Instructions were provided and reviewed. Her Plavix will be held for 5 days prior to endoscopic evaluation. Plan Orders She is to schedule a follow-up visit to be determined after testing/procedure. 2. Assessment Nausea and vomiting in adult patient (R11.2). Provider Plan She has previously responded to Zofran daily and Phenergan suppositories PRN then about once weekly per OV note from 2016. Further recommendations based on findings at EGD. 3. Assessment Gastro-esophageal reflux disease without esophagitis (K21.9). Provider Plan Continue Protonix for now. Consider addition of H2 antagonist. Dietary and lifestyle measures for management of reflux disease were reviewed. Avoid all gastric irritants to include NSAIDs, high-dose aspirin, tobacco, caffeine and alcohol. Plan Orders The patient had the following test(s) completed today: Barium [address] [id number], Next Lab Date is and EGD W/WO Cytology to be performed. 4. Assessment Diarrhea in adult patient (R19.7). Provider Plan She is due for surveillance colonoscopy. Colonoscopy discussed but deferred secondary to nausea and upper GI symptoms. This will be helpful to further evaluate her symptoms of diarrhea though infectious etiology is unlikely her reported symptoms of constipation weekly interspersed with her diarrhea. 5. Assessment History of colon polyps (Z86.010). 6. Assessment History of liver transplant (Z94.4).          7. Assessment Long term (current) use of anticoagulants (Z79.01). Fall Risk Plan:  The patient has fallen 2 times in the last year. Counseling / Educational Factors:  Items reviewed / discussed during today's visit: prior testing / procedures were reviewed; testing was discussed in detail; old records were reviewed; indications and risks of the procedure were discussed; prescription medication usage was discussed; symptomatic care was discussed; advised to continue current treatment; diet / fluid instructions were provided. Patient expressed understanding of instructions. The patient was checked out at 12:07 PM by [employer]man [employer]. Covert. I personally performed the services described in this documentation. Arlene Benitez (Scribe) assisted in my presence with documentation, which I have reviewed and validated. Provider:   Cheryl Champion 07/28/2019 3:44 PM   Document generated by: Michele Paz. Felicia Young MD 07/28/2019    CC Providers:  Grace Ashford Scanning Fax     [facility]  [address] [place] [address], [facility] [address] [place] [address]-        Electronically signed by Michele Paz.  Felicia Young MD on 07/28/2019 03:46 PM

## 2019-09-06 NOTE — ROUTINE PROCESS
Pt discharged home with son, son driving, VSS, instructions reviewed with Pt and son, understanding verbalized. All belongings sent home with Pt. Pt transported out via wheelchair by des Pineda.

## 2019-11-22 ENCOUNTER — PATIENT OUTREACH (OUTPATIENT)
Dept: CASE MANAGEMENT | Age: 69
End: 2019-11-22

## 2019-11-22 NOTE — PROGRESS NOTES
This note will not be viewable in [address] Complex Case Management  Initial Assessment  Addendum to Connect Care   Note  Utilize questions pertinent to patient    Referral Source & Reason MSSP   A1c 11.1 7/2018   Primary concerns per patient and/or pts family/CG RNCM spoke w/ pt & dgtr Bob Wang. Bob Wang is staying w/ mother d/t family deaths (10 in the last couple mos). Recent Hospitalization(s)/ED Visits none   Current with Home Health?  - Agency? No   Social Needs:   Able to afford medications?  Financial assessment?  Access to care? Transportation? Drive? Yes, medicare and medicaid  Difficulty affording DM and incontinence supplies. Pt drives occasionally   Nutritional Assessment   Appetite?  Obesity?  Failure to Thrive?  Bowels ? Fair  No  No     Cognitive Assessment   History of dementia   Health literacy   Depression    No    Yes    Mobility/Activity Assessment   Bed/chair bound?  Make use of assistive devices? No  No    Plan/Interventions/Education   Follow up Appointments   Referrals   RNCM provided pt w/ the following resources.  Next Steps:    Discussed upcoming Appointments:    NONE  RNCM requested PCP office send order to [facility] for incontinence supplies. Will research options for DM supplies, scheduled f/u in 1-2wk.

## 2019-12-05 ENCOUNTER — ANESTHESIA EVENT (OUTPATIENT)
Dept: ENDOSCOPY | Age: 69
End: 2019-12-05
Payer: MEDICARE

## 2019-12-06 ENCOUNTER — ANESTHESIA (OUTPATIENT)
Dept: ENDOSCOPY | Age: 69
End: 2019-12-06
Payer: MEDICARE

## 2019-12-06 ENCOUNTER — HOSPITAL ENCOUNTER (OUTPATIENT)
Age: 69
Setting detail: OUTPATIENT SURGERY
Discharge: HOME | End: 2019-12-06
Attending: INTERNAL MEDICINE | Admitting: INTERNAL MEDICINE
Payer: MEDICARE

## 2019-12-06 VITALS
SYSTOLIC BLOOD PRESSURE: 116 MMHG | BODY MASS INDEX: 18.78 KG/M2 | DIASTOLIC BLOOD PRESSURE: 56 MMHG | RESPIRATION RATE: 16 BRPM | TEMPERATURE: 98 F | HEIGHT: 64 IN | HEART RATE: 72 BPM | OXYGEN SATURATION: 99 % | WEIGHT: 110 LBS

## 2019-12-06 LAB — GLUCOSE BLD STRIP.AUTO-MCNC: 225 MG/DL (ref 65–100)

## 2019-12-06 PROCEDURE — 74011250636 HC RX REV CODE- 250/636: Performed by: NURSE ANESTHETIST, CERTIFIED REGISTERED

## 2019-12-06 PROCEDURE — 76040000025: Performed by: INTERNAL MEDICINE

## 2019-12-06 PROCEDURE — C1726 CATH, BAL DIL, NON-VASCULAR: HCPCS | Performed by: INTERNAL MEDICINE

## 2019-12-06 PROCEDURE — 74011250636 HC RX REV CODE- 250/636: Performed by: ANESTHESIOLOGY

## 2019-12-06 PROCEDURE — 82962 GLUCOSE BLOOD TEST: CPT

## 2019-12-06 PROCEDURE — 76060000032 HC ANESTHESIA 0.5 TO 1 HR: Performed by: INTERNAL MEDICINE

## 2019-12-06 RX ORDER — SODIUM CHLORIDE 0.9 % (FLUSH) 0.9 %
5-40 SYRINGE (ML) INJECTION EVERY 8 HOURS
Status: DISCONTINUED | OUTPATIENT
Start: 2019-12-06 | End: 2019-12-06 | Stop reason: HOSPADM

## 2019-12-06 RX ORDER — SODIUM CHLORIDE 0.9 % (FLUSH) 0.9 %
5-40 SYRINGE (ML) INJECTION AS NEEDED
Status: DISCONTINUED | OUTPATIENT
Start: 2019-12-06 | End: 2019-12-06 | Stop reason: HOSPADM

## 2019-12-06 RX ORDER — PROPOFOL 10 MG/ML
INJECTION, EMULSION INTRAVENOUS AS NEEDED
Status: DISCONTINUED | OUTPATIENT
Start: 2019-12-06 | End: 2019-12-06 | Stop reason: HOSPADM

## 2019-12-06 RX ORDER — SODIUM CHLORIDE, SODIUM LACTATE, POTASSIUM CHLORIDE, CALCIUM CHLORIDE 600; 310; 30; 20 MG/100ML; MG/100ML; MG/100ML; MG/100ML
100 INJECTION, SOLUTION INTRAVENOUS CONTINUOUS
Status: DISCONTINUED | OUTPATIENT
Start: 2019-12-06 | End: 2019-12-06 | Stop reason: HOSPADM

## 2019-12-06 RX ADMIN — PROPOFOL 30 MG: 10 INJECTION, EMULSION INTRAVENOUS at 13:09

## 2019-12-06 RX ADMIN — PROPOFOL 30 MG: 10 INJECTION, EMULSION INTRAVENOUS at 13:05

## 2019-12-06 RX ADMIN — PROPOFOL 50 MG: 10 INJECTION, EMULSION INTRAVENOUS at 13:02

## 2019-12-06 RX ADMIN — SODIUM CHLORIDE, SODIUM LACTATE, POTASSIUM CHLORIDE, AND CALCIUM CHLORIDE 100 ML/HR: 600; 310; 30; 20 INJECTION, SOLUTION INTRAVENOUS at 12:12

## 2019-12-06 RX ADMIN — PROPOFOL 30 MG: 10 INJECTION, EMULSION INTRAVENOUS at 13:04

## 2019-12-06 NOTE — H&P
Gastroenterology Outpatient History and Physical    Patient: Ramesh Alves    Physician: Ervin Love MD    Vital Signs: Blood pressure 182/72, pulse 78, temperature 98 °F (36.7 °C), resp. rate 16, height 5' 3.75\" (1.619 m), weight 49.9 kg (110 lb), SpO2 98 %, not currently breastfeeding. Allergies: Allergies   Allergen Reactions    Adhesive Swelling     At site-local- can use paper tape    Bactroban [Mupirocin Calcium] Unknown (comments)    Benadryl [Diphenhydramine Hcl] Hives    Iodinated Contrast Media Shortness of Breath    Iohexol Unknown (comments)    Macrobid [Nitrofurantoin Monohyd/M-Cryst] Unknown (comments)    Metoclopramide Hcl Other (comments)    Other Medication Other (comments)     Tape \"takes my skin off\", epidural that patient had when hospitalized in March 2015    Reglan [Metoclopramide] Other (comments)     Mouth twitches       Chief Complaint: Dysphagia, Hx of Esophageal stricture    History of Present Illness: Last dilated on 9/6/19. Has recurrent symptoms few weeks after dilation and food impaction a week ago. Justification for Procedure: Evaluate and dilate the stricture as needed.      History:  Past Medical History:   Diagnosis Date    [facility]-fib [facility]     Adverse effect of anesthesia     overly sedated with epidural with surgery march- 2015, swelling, difficult awakening    Anemia     Asthma     B12 deficiency     Bone/cartilage disorder     CAD (coronary artery disease)     cardiac stents x 3    Cervical cancer (HCC)     Chronic ischemic heart disease     Chronic kidney disease     small ureters, CKF    Chronic nausea     Chronic pain     back    Chronic UTI     Cirrhosis (HCC)     Cirrhosis (HCC)     CKD (chronic kidney disease)     Coagulation disorder (HCC)     thylocemia b-minor    COPD (chronic obstructive pulmonary disease) (HCC)     Diabetes ([facility] [address]     avg-150-170,hypo symptoms in 80s    GERD (gastroesophageal reflux disease)     managed with medications    Glaucoma     Hemochromatosis carrier     Hernia     Hyperlipidemia     Hypertension     Hypomagnesemia     IBS (irritable bowel syndrome)     Incontinence of urine in female     Liver transplanted ([facility] [address] 2001    Lumbago     Osteoporosis     PUD (peptic ulcer disease)     Restless leg syndrome     Splenomegaly     [facility]     no residual/pt denies states had a micro-stroke with memory loss    Ventral hernia, recurrent       Past Surgical History:   Procedure Laterality Date    CARDIAC SURG PROCEDURE UNLIST      3 stents    HX APPENDECTOMY      HX CATARACT REMOVAL Bilateral     HX HERNIA REPAIR Right     total of 6    HX HYSTERECTOMY      HX LIVER TRANSPLANT      HX OPEN CHOLECYSTECTOMY      HX OTHER SURGICAL  07/2013    basal cell carcinoma excision    HX TOTAL ABDOMINAL HYSTERECTOMY      HX TRANSPLANT  2001    Liver transplant      Social History     Socioeconomic History    Marital status:      Spouse name: Not on file    Number of children: Not on file    Years of education: Not on file    Highest education level: Not on file   Tobacco Use    Smoking status: Never Smoker    Smokeless tobacco: Never Used   Substance and Sexual Activity    Alcohol use: No    Drug use: No      Family History   Problem Relation Age of Onset    Cancer Mother         mouth    Liver Disease Brother     Diabetes Sister     Arthritis-osteo Sister     Heart Disease Brother        Medications:   Prior to Admission medications    Medication Sig Start Date End Date Taking?  Authorizing Provider   nitroglycerin (NITRODUR) 0.6 mg/hr pt24 APPLY 1 PATCH TO SKIN EVERY DAY 10/20/19 1/18/20 Yes Matthew Carson MD   ondansetron hcl (ZOFRAN) 8 mg tablet TAKE 1 TABLET BY MOUTH EVERY 8 HOURS AS NEEDED FOR NAUSEA 10/14/19  Yes Edi CAMARA, NP   folic acid (FOLVITE) 1 mg tablet TAKE 1 TABLET BY MOUTH ONCE DAILY 10/7/19  Yes Matthew Carson MD   mycophenolate sodium (MYFORTIC) 360 mg delayed release tablet Take  by mouth two (2) times a day. Take 2 tab twice a day   Indications: Liver Transplant   Yes Provider, Historical   tolterodine (DETROL) 2 mg tablet Take 2 mg by mouth nightly. Yes Provider, Historical   insulin degludec (TRESIBA FLEXTOUCH U-100) 100 unit/mL (3 mL) inpn INJECT 18 UNITS DAILY - BEDTIME  Indications: type 2 diabetes mellitus 8/15/19  Yes Venkatesh Jamil MD   clopidogrel (PLAVIX) 75 mg tab TAKE 1 TABLET BY MOUTH EVERY MORNING 8/8/19  Yes Venkatesh Jamil MD   metoprolol tartrate (LOPRESSOR) 50 mg tablet TAKE 1 TABLET BY MOUTH THREE TIMES A DAY 8/8/19  Yes Venkatesh Jamil MD   fenofibrate micronized (LOFIBRA) 134 mg capsule TAKE 1 CAPSULE BY MOUTH NIGHTLY 8/8/19  Yes Venkatesh Jamil MD   alcaftadine (LASTACAFT) 0.25 % drop nightly. 7/6/15  Yes Provider, Historical   pravastatin (PRAVACHOL) 20 mg tablet TAKE 1 TABLET BY MOUTH NIGHTLY 7/9/19  Yes Venkatesh Jamil MD   HORIZANT 600 mg TbER daily (after dinner). 5/9/19  Yes Provider, Historical   insulin aspart U-100 (NOVOLOG) 100 unit/mL (3 mL) inpn 10-12 Units by SubCUTAneous route Before breakfast, lunch, and dinner. Yes Provider, Historical   bimatoprost (LUMIGAN) 0.01 % ophthalmic drops Administer 1 Drop to both eyes nightly. Yes Provider, Historical   oxyCODONE IR (ROXICODONE) 10 mg tab immediate release tablet Take 10 mg by mouth two (2) times a day. Yes Provider, Historical   sodium bicarbonate 325 mg tablet TAKE 2 TABLETS BY MOUTH THREE TIMES A DAY 11/18/18  Yes Randell Cifuentes MD   aspirin (ASPIRIN) 325 mg tablet Take 325 mg by mouth daily. Yes Provider, Historical   lubiPROStone (AMITIZA) 8 mcg capsule Take  by mouth daily as needed for Constipation.     Provider, Historical   promethazine (PHENERGAN) 25 mg suppository  3/23/15   Provider, Historical   PROAIR HFA 90 mcg/actuation inhaler INHALE 2 PUFFS BY MOUTH EVERY 4 HOURS 9/13/17   Randell Cifuentes MD   DARBEPOETIN KACI IN ALBUMN SOL (ARANESP IJ) by Injection route. One q 2 weeks - managed by [address] [place] [address]. Provider, Historical       Physical Exam:   General: alert, cooperative, no distress   HEENT: Head: Normocephalic, no lesions, without obvious abnormality. Heart: regular rate and rhythm, S1, S2 normal, no murmur, click, rub or gallop   Lungs: chest clear, no wheezing, rales, normal symmetric air entry   Abdominal: Bowel sounds are normal. Ventral hernia. Neurological: Grossly normal   Extremities: no edema     Findings/Diagnosis: Dysphagia, Hx of Esophageal stricture    Plan of Care/Planned Procedure: EGD with Dilation.     Ervin Love MD

## 2019-12-07 LAB
GRAM STN SPEC: NORMAL
SERVICE CMNT-IMP: NORMAL

## 2019-12-07 NOTE — PROCEDURES
OPERATIVE NOTE    Date of Procedure: 12/6/2019   Preoperative Diagnosis: Dysphagia [R13.19]  Gastroesophageal reflux disease without esophagitis [K21.9]  Hx of esophageal stricture  Postoperative Diagnosis: Esophageal stricture, Esophageal ulcers, esophagitis, gastritis    Procedure(s):  ESOPHAGOGASTRODUODENOSCOPY BMI 19  ENDOSCOPIC BRUSHING  ESOPHAGEAL DILATION - BALLOON  Surgeon(s) and Role:     Joy Aguilar MD - Primary  Surgical Staff:  Endoscopy Technician-1: Jenifer Guerra  Endoscopy RN-1: Anni Patel  No case tracking events are documented in the log. Anesthesia: MAC   Estimated Blood Loss: < 10 mL  Specimens:   ID Type Source Tests Collected by Time Destination   1 : Esophageal brushing Tissue Esophagus, Proximal  Ching Pinto MD 12/6/2019 1316 Microbiology     Procedure:  After obtaining informed consent, the patient was placed in the left lateral position and received IV anesthesia. See anesthesia records for details. The endoscope was advanced under direct vision without difficulty. The esophagus, stomach (including retroflexed views) and duodenum were evaluated. The patient was taken to the recovery area in stable condition. Comments:  A CRE balloon was used to dilate the stricture and serial inflation performed to 12, 13.5 and 15 mm with superficial mucosal tearing and bleeding with spontaneous hemostasis. Findings:  OROPHARYNX:  Cords and Pyriform recesses appeared normal.  ESOPHAGUS:  The proximal esophagus shows patchy white exudate with underlying mucosal inflammation and friability suggestive of Candida esophagitis. The mid esophagus appeared normal. The Z line was not distinct secondary to severe esophagitis with ulcerations and stricture at and just above EG junction - possibly from recent food impaction or reflux esophagitis. Presence of Humphreys's was not identified but cannot be excluded. See dilation and post dilation findings above.   STOMACH:  The fundus on antegrade and retroflexed views was normal. The antrum showed mild focal erythema consistent with mild gastritis without erosions or ulcers. The body and pylorus appeared normal.  DUODENUM:  The bulb and second portions appeared normal.    Recommendations:   Routine post endoscopy instructions. Soft diet today - to be advanced as tolerated. May resume Plavix tomorrow. Dysphagia precautions with avoidance of chunky meats, dry bread and large pills. Protonix 40 mg or alternate PPI b.i.d. Nystatin swish and swallow for suspected esophageal candidiasis and oral sores. Repeat EGD with dilation in 4-8 weeks. Findings and recommendations were reviewed with patient and attendant in recovery area after the procedure. Complications: None.     Implants: * No implants in log *    Tanja Francois MD

## 2020-01-12 ENCOUNTER — ANESTHESIA EVENT (OUTPATIENT)
Dept: ENDOSCOPY | Age: 70
End: 2020-01-12
Payer: MEDICARE

## 2020-01-13 ENCOUNTER — HOSPITAL ENCOUNTER (OUTPATIENT)
Age: 70
Setting detail: OUTPATIENT SURGERY
Discharge: HOME | End: 2020-01-13
Attending: INTERNAL MEDICINE | Admitting: INTERNAL MEDICINE
Payer: MEDICARE

## 2020-01-13 ENCOUNTER — ANESTHESIA (OUTPATIENT)
Dept: ENDOSCOPY | Age: 70
End: 2020-01-13
Payer: MEDICARE

## 2020-01-13 VITALS
OXYGEN SATURATION: 100 % | RESPIRATION RATE: 16 BRPM | HEART RATE: 64 BPM | SYSTOLIC BLOOD PRESSURE: 161 MMHG | TEMPERATURE: 98 F | DIASTOLIC BLOOD PRESSURE: 71 MMHG

## 2020-01-13 LAB — GLUCOSE BLD STRIP.AUTO-MCNC: 166 MG/DL (ref 65–100)

## 2020-01-13 PROCEDURE — 74011250636 HC RX REV CODE- 250/636: Performed by: ANESTHESIOLOGY

## 2020-01-13 PROCEDURE — 76060000031 HC ANESTHESIA FIRST 0.5 HR: Performed by: INTERNAL MEDICINE

## 2020-01-13 PROCEDURE — 76040000025: Performed by: INTERNAL MEDICINE

## 2020-01-13 PROCEDURE — 74011250636 HC RX REV CODE- 250/636: Performed by: NURSE ANESTHETIST, CERTIFIED REGISTERED

## 2020-01-13 PROCEDURE — 77030021593 HC FCPS BIOP ENDOSC BSC -A: Performed by: INTERNAL MEDICINE

## 2020-01-13 PROCEDURE — C1726 CATH, BAL DIL, NON-VASCULAR: HCPCS | Performed by: INTERNAL MEDICINE

## 2020-01-13 PROCEDURE — 82962 GLUCOSE BLOOD TEST: CPT

## 2020-01-13 PROCEDURE — 88305 TISSUE EXAM BY PATHOLOGIST: CPT

## 2020-01-13 RX ORDER — PROPOFOL 10 MG/ML
INJECTION, EMULSION INTRAVENOUS
Status: DISCONTINUED | OUTPATIENT
Start: 2020-01-13 | End: 2020-01-13 | Stop reason: HOSPADM

## 2020-01-13 RX ORDER — SODIUM CHLORIDE, SODIUM LACTATE, POTASSIUM CHLORIDE, CALCIUM CHLORIDE 600; 310; 30; 20 MG/100ML; MG/100ML; MG/100ML; MG/100ML
100 INJECTION, SOLUTION INTRAVENOUS CONTINUOUS
Status: DISCONTINUED | OUTPATIENT
Start: 2020-01-13 | End: 2020-01-13 | Stop reason: HOSPADM

## 2020-01-13 RX ORDER — PROPOFOL 10 MG/ML
INJECTION, EMULSION INTRAVENOUS AS NEEDED
Status: DISCONTINUED | OUTPATIENT
Start: 2020-01-13 | End: 2020-01-13 | Stop reason: HOSPADM

## 2020-01-13 RX ADMIN — PROPOFOL 40 MG: 10 INJECTION, EMULSION INTRAVENOUS at 10:29

## 2020-01-13 RX ADMIN — SODIUM CHLORIDE, SODIUM LACTATE, POTASSIUM CHLORIDE, AND CALCIUM CHLORIDE 100 ML/HR: 600; 310; 30; 20 INJECTION, SOLUTION INTRAVENOUS at 10:23

## 2020-01-13 RX ADMIN — SODIUM CHLORIDE, SODIUM LACTATE, POTASSIUM CHLORIDE, AND CALCIUM CHLORIDE: 600; 310; 30; 20 INJECTION, SOLUTION INTRAVENOUS at 10:25

## 2020-01-13 RX ADMIN — PROPOFOL 20 MG: 10 INJECTION, EMULSION INTRAVENOUS at 10:31

## 2020-01-13 RX ADMIN — PROPOFOL 140 MCG/KG/MIN: 10 INJECTION, EMULSION INTRAVENOUS at 10:29

## 2020-01-13 RX ADMIN — PROPOFOL 20 MG: 10 INJECTION, EMULSION INTRAVENOUS at 10:32

## 2020-01-13 NOTE — ANESTHESIA PREPROCEDURE EVALUATION
Anesthetic History   No history of anesthetic complications            Review of Systems / Medical History  Patient summary reviewed and pertinent labs reviewed    Pulmonary    COPD: mild        Asthma : well controlled       Neuro/Psych         TIA and psychiatric history     Cardiovascular    Hypertension: well controlled        Dysrhythmias : atrial fibrillation  CAD and cardiac stents    Exercise tolerance: <4 METS  Comments: 7/2017 TTE: EF >55%; Mild pulm HTN   GI/Hepatic/Renal     GERD: well controlled    Renal disease: CRI  PUD and liver disease (liver transplant)     Endo/Other    Diabetes: well controlled, type 2         Other Findings   Comments: Dysphagia           Physical Exam    Airway  Mallampati: II  TM Distance: 4 - 6 cm  Neck ROM: normal range of motion   Mouth opening: Normal     Cardiovascular    Rhythm: regular  Rate: normal         Dental    Dentition: Full upper dentures  Comments: Upper implants.  Very poor dentition on lowers with some loose teeth in front   Pulmonary  Breath sounds clear to auscultation               Abdominal  GI exam deferred       Other Findings            Anesthetic Plan    ASA: 3  Anesthesia type: total IV anesthesia          Induction: Intravenous  Anesthetic plan and risks discussed with: Patient

## 2020-01-13 NOTE — DISCHARGE INSTRUCTIONS
Gastrointestinal Esophagogastroduodenoscopy (EGD) - Upper Exam Discharge Instructions    1. Call Dr. Parham Form at [phone] for any problems or questions. 2. Contact the doctor's office for follow up appointment as directed. 3. Medication may cause drowsiness for several hours, therefore:  · Do not drive or operate machinery for remainder of the day. · No alcohol today. · Do not make any important or legal decisions for 24 hours. · Do not sign any legal documents for 24 hours. 5. Ordinarily, you may resume regular diet and activity after exam unless otherwise specified by your physician. 6. For mild soreness in your throat you may use Cepacol throat lozenges or warm salt-water gargles as needed. Any additional instructions:   1. Soft diet today. 2. Advance diet tomorrow as tolerated. 3. Omeprazole 40 mg daily. 4. Hold Plavix and avoid NSAIDs for 48 hours. 5. Follow-up pathology results. 6. Return to office in 2 months. Instructions given to Brittany Enriquez and other family members.

## 2020-01-13 NOTE — OP NOTES
Gastroenterology Procedure Note           Procedure:  Esophagogastroduodenoscopy, Esophageal dilation     Date of Procedure:  1/13/2020    Patient:  Shreyas Boogie     1950    Indication:  GERD, dysphagia    Sedation:  MAC    Pre-Procedure Physical Exam:    Mental status:  alert and oriented  Airway:  normal oropharyngeal airway and neck mobility  CV:  regular rate and rhythm  Respiratory:  clear to auscultation    Procedure:  A History and Physical has been performed, and patient medication allergies have been reviewed. Risks of perforation, hemorrhage, adverse drug reaction, and aspiration were discussed. Informed consent was obtained for the procedure, including sedation. The patient was placed in the left lateral decubitus position. The heart rate, oxygen saturations, blood pressure, and response to care were monitored throughout the procedure. The gastroscope was passed through the mouth and advanced under direct vision to the second portion of the duodenum. A careful inspection was made as the gastroscope was withdrawn, including a retroflexed view of the proximal stomach. The patient tolerated the procedure well. Findings:     OROPHARYNX: Cords and pyriform recesses appear normal.   ESOPHAGUS: A benign-appearing intrinsic stenosis was seen in the distal esophagus. Dilation was performed using a TTS balloon dilator to a lumen diameter of 15 mm. Successful dilation was confirmed endoscopically by the presence of mucosal disruption. A second tear was seen in the proximal esophagus at the site of a separate stenosis. Biopsies were obtained for exclusion of eosinophilic esophagitis. STOMACH: The fundus on antegrade and retroflexed views is normal. The body, antrum, and pylorus are normal.   DUODENUM: The bulb and second portions are normal.    Specimen:  Yes    Estimated Blood Loss:  Minimal    Implant:  None           Impression:    Benign appearing esophageal stenoses. Plan:  1.  Soft diet today. 2. Advance diet tomorrow as tolerated. 3. Omeprazole 40 mg daily. 4. Hold Plavix and avoid NSAIDs for 48 hours. 5. Follow-up pathology results. 6. Return to office in 2 months.      Signed:  Dinesh Brown MD  1/13/2020  8:40 AM

## 2020-01-13 NOTE — H&P
History and Physical for Outpatient Procedure             Date: 1/13/2020     History of Present Illness:  Patient presents to undergo EGD.        Past Medical History:   Diagnosis Date    A-fib [facility])     Adverse effect of anesthesia     overly sedated with epidural with surgery march- 2015, swelling, difficult awakening    Anemia     Asthma     B12 deficiency     Bone/cartilage disorder     CAD (coronary artery disease)     cardiac stents x 3    Cervical cancer (HCC)     Chronic ischemic heart disease     Chronic kidney disease     small ureters, CKF    Chronic nausea     Chronic pain     back    Chronic UTI     Cirrhosis (HCC)     Cirrhosis (HCC)     CKD (chronic kidney disease)     Coagulation disorder (HCC)     thylocemia b-minor    COPD (chronic obstructive pulmonary disease) (HCC)     Diabetes ([address]     avg-150-170,hypo symptoms in 80s    GERD (gastroesophageal reflux disease)     managed with medications    Glaucoma     Hemochromatosis carrier     Hernia     Hyperlipidemia     Hypertension     Hypomagnesemia     IBS (irritable bowel syndrome)     Incontinence of urine in female     Liver transplanted ([address] 2001    Lumbago     Osteoporosis     PUD (peptic ulcer disease)     Restless leg syndrome     Splenomegaly     Stroke ([address]     no residual/pt denies states had a micro-stroke with memory loss    Ventral hernia, recurrent      Past Surgical History:   Procedure Laterality Date    CARDIAC SURG PROCEDURE UNLIST      3 stents    HX APPENDECTOMY      HX CATARACT REMOVAL Bilateral     HX HERNIA REPAIR Right     total of 6    HX HYSTERECTOMY      HX LIVER TRANSPLANT      HX OPEN CHOLECYSTECTOMY      HX OTHER SURGICAL  07/2013    basal cell carcinoma excision    HX TOTAL ABDOMINAL HYSTERECTOMY      HX TRANSPLANT  2001    Liver transplant     In and  Family History   Problem Relation Age of Onset    Cancer Mother         mouth    Liver Disease Brother     Diabetes Sister     Arthritis-osteo Sister     Heart Disease Brother      Social History     Tobacco Use    Smoking status: Never Smoker    Smokeless tobacco: Never Used   Substance Use Topics    Alcohol use: No        Allergies   Allergen Reactions    Adhesive Swelling     At site-local- can use paper tape    Bactroban [Mupirocin Calcium] Unknown (comments)    Benadryl [Diphenhydramine Hcl] Hives    Iodinated Contrast Media Shortness of Breath    Iohexol Unknown (comments)    Macrobid [Nitrofurantoin Monohyd/M-Cryst] Unknown (comments)    Metoclopramide Hcl Other (comments)    Other Medication Other (comments)     Tape \"takes my skin off\", epidural that patient had when hospitalized in March 2015    Reglan [Metoclopramide] Other (comments)     Mouth twitches     No current facility-administered medications for this encounter. Current Outpatient Medications   Medication Sig    ondansetron hcl (ZOFRAN) 8 mg tablet TAKE 1 TABLET BY MOUTH EVERY 8 HOURS AS NEEDED FOR NAUSEA    nitroglycerin (NITRODUR) 0.6 mg/hr pt24 APPLY 1 PATCH TO SKIN EVERY DAY    folic acid (FOLVITE) 1 mg tablet TAKE 1 TABLET BY MOUTH ONCE DAILY    lubiPROStone (AMITIZA) 8 mcg capsule Take  by mouth daily as needed for Constipation.  mycophenolate sodium (MYFORTIC) 360 mg delayed release tablet Take  by mouth two (2) times a day. Take 2 tab twice a day   Indications: Liver Transplant    tolterodine (DETROL) 2 mg tablet Take 2 mg by mouth nightly.  insulin degludec (TRESIBA FLEXTOUCH U-100) 100 unit/mL (3 mL) inpn INJECT 18 UNITS DAILY - BEDTIME  Indications: type 2 diabetes mellitus    clopidogrel (PLAVIX) 75 mg tab TAKE 1 TABLET BY MOUTH EVERY MORNING    metoprolol tartrate (LOPRESSOR) 50 mg tablet TAKE 1 TABLET BY MOUTH THREE TIMES A DAY    fenofibrate micronized (LOFIBRA) 134 mg capsule TAKE 1 CAPSULE BY MOUTH NIGHTLY    alcaftadine (LASTACAFT) 0.25 % drop nightly.     promethazine (PHENERGAN) 25 mg suppository     pravastatin (PRAVACHOL) 20 mg tablet TAKE 1 TABLET BY MOUTH NIGHTLY    HORIZANT 600 mg TbER daily (after dinner).  insulin aspart U-100 (NOVOLOG) 100 unit/mL (3 mL) inpn 10-12 Units by SubCUTAneous route Before breakfast, lunch, and dinner.  bimatoprost (LUMIGAN) 0.01 % ophthalmic drops Administer 1 Drop to both eyes nightly.  oxyCODONE IR (ROXICODONE) 10 mg tab immediate release tablet Take 10 mg by mouth two (2) times a day.  sodium bicarbonate 325 mg tablet TAKE 2 TABLETS BY MOUTH THREE TIMES A DAY    PROAIR HFA 90 mcg/actuation inhaler INHALE 2 PUFFS BY MOUTH EVERY 4 HOURS    DARBEPOETIN KACI IN ALBUMN SOL (ARANESP IJ) by Injection route. One q 2 weeks - managed by [address] [place] [address].  aspirin (ASPIRIN) 325 mg tablet Take 325 mg by mouth daily. Review of Systems:  A detailed 10 organ review of systems is obtained with pertinent positives as listed in the History of Present Illness. All others are negative. Objective:     Physical Exam:  There were no vitals taken for this visit. General:  Alert and oriented. Heart: Regular rate and rhythm  Lungs:  Clear to auscultation bilaterally  Abdomen: Soft, nontender, nondistended    Impression/Plan:     Proceed with EGD as planned. I have discussed with the patient the technique, benefits, alternatives, and risks of these procedures, including medication reaction, immediate or delayed bleeding, or perforation of the gastrointestinal tract.      Signed By: Skip Eason MD     January 13, 2020

## 2020-01-13 NOTE — ROUTINE PROCESS
VSS. No complaints noted at this time. Education given and reviewed with son and daughter. Pt discharged via wheelchair by Rosalba Polanco RN.

## 2020-01-13 NOTE — ANESTHESIA POSTPROCEDURE EVALUATION
Procedure(s):  ESOPHAGOGASTRODUODENOSCOPY (EGD)/ 19  ESOPHAGEAL DILATION  ESOPHAGOGASTRODUODENAL (EGD) BIOPSY. total IV anesthesia    Anesthesia Post Evaluation      Multimodal analgesia: multimodal analgesia not used between 6 hours prior to anesthesia start to PACU discharge  Patient location during evaluation: bedside  Patient participation: complete - patient participated  Level of consciousness: awake  Pain score: 1  Pain management: adequate  Airway patency: patent  Anesthetic complications: no  Cardiovascular status: acceptable  Respiratory status: acceptable  Hydration status: acceptable  Comments: Pt doing well. Post anesthesia nausea and vomiting:  none      Vitals Value Taken Time   /74 1/13/2020 10:53 AM   Temp 36.7 °C (98 °F) 1/13/2020 10:44 AM   Pulse 66 1/13/2020 11:00 AM   Resp 12 1/13/2020 10:44 AM   SpO2 100 % 1/13/2020 11:00 AM   Vitals shown include unvalidated device data.

## 2020-01-29 ENCOUNTER — PATIENT OUTREACH (OUTPATIENT)
Dept: CASE MANAGEMENT | Age: 70
End: 2020-01-29

## 2020-01-29 NOTE — PROGRESS NOTES
This note will not be viewable in [address] RNCM attempted to reach pt regarding CCM services, no answer and vm not set up. Will continue attempts to reach pt.

## 2020-02-04 ENCOUNTER — PATIENT OUTREACH (OUTPATIENT)
Dept: CASE MANAGEMENT | Age: 70
End: 2020-02-04

## 2020-02-04 NOTE — PROGRESS NOTES
This note will not be viewable in [address] RNCM attempted f/u w/ pt, no answer and no vm set up at pt number. Number listed for dgtr may be invalid, different name on vm than listed. Will make one additional attempt and close if unable to be reached.

## 2020-02-11 ENCOUNTER — PATIENT OUTREACH (OUTPATIENT)
Dept: CASE MANAGEMENT | Age: 70
End: 2020-02-11

## 2020-02-11 NOTE — PROGRESS NOTES
This note will not be viewable in [address] RNCM final attempt to engage in CCM services, no answer and no vm set up. Please consult RNCM if needs arise and pt is able to be reached.

## 2020-03-04 PROBLEM — R19.7 DIARRHEA IN ADULT PATIENT: Status: ACTIVE | Noted: 2020-03-04

## 2020-03-04 PROBLEM — K21.9 GASTRO-ESOPHAGEAL REFLUX DISEASE WITHOUT ESOPHAGITIS: Status: ACTIVE | Noted: 2020-03-04

## 2020-03-04 PROBLEM — R14.0 ABDOMINAL BLOATING: Status: ACTIVE | Noted: 2020-03-04

## 2020-03-04 PROBLEM — Z98.890 OTHER SPECIFIED POSTPROCEDURAL STATES: Status: ACTIVE | Noted: 2020-03-04

## 2020-03-04 PROBLEM — Z79.01 LONG TERM (CURRENT) USE OF ANTICOAGULANTS: Status: ACTIVE | Noted: 2020-03-04

## 2020-03-04 PROBLEM — R10.33 PERIUMBILICAL ABDOMINAL PAIN: Status: ACTIVE | Noted: 2020-03-04

## 2020-03-04 PROBLEM — R13.10 DYSPHAGIA: Status: ACTIVE | Noted: 2020-02-17

## 2020-03-04 PROBLEM — E11.21 TYPE 2 DIABETES WITH NEPHROPATHY (HCC): Status: ACTIVE | Noted: 2020-03-04

## 2020-03-04 PROBLEM — I47.1 SVT (SUPRAVENTRICULAR TACHYCARDIA) (HCC): Status: ACTIVE | Noted: 2020-02-18

## 2020-09-02 ENCOUNTER — ANESTHESIA EVENT (OUTPATIENT)
Dept: ENDOSCOPY | Age: 70
End: 2020-09-02
Payer: MEDICARE

## 2020-09-02 RX ORDER — SODIUM CHLORIDE, SODIUM LACTATE, POTASSIUM CHLORIDE, CALCIUM CHLORIDE 600; 310; 30; 20 MG/100ML; MG/100ML; MG/100ML; MG/100ML
100 INJECTION, SOLUTION INTRAVENOUS CONTINUOUS
Status: CANCELLED | OUTPATIENT
Start: 2020-09-02

## 2020-09-02 RX ORDER — SODIUM CHLORIDE 0.9 % (FLUSH) 0.9 %
5-40 SYRINGE (ML) INJECTION AS NEEDED
Status: CANCELLED | OUTPATIENT
Start: 2020-09-02

## 2020-09-02 RX ORDER — SODIUM CHLORIDE 0.9 % (FLUSH) 0.9 %
5-40 SYRINGE (ML) INJECTION EVERY 8 HOURS
Status: CANCELLED | OUTPATIENT
Start: 2020-09-02

## 2020-09-03 ENCOUNTER — HOSPITAL ENCOUNTER (OUTPATIENT)
Age: 70
Setting detail: OUTPATIENT SURGERY
Discharge: HOME | End: 2020-09-03
Attending: INTERNAL MEDICINE | Admitting: INTERNAL MEDICINE
Payer: MEDICARE

## 2020-09-03 ENCOUNTER — ANESTHESIA (OUTPATIENT)
Dept: ENDOSCOPY | Age: 70
End: 2020-09-03
Payer: MEDICARE

## 2020-09-03 VITALS
HEART RATE: 56 BPM | OXYGEN SATURATION: 100 % | SYSTOLIC BLOOD PRESSURE: 152 MMHG | DIASTOLIC BLOOD PRESSURE: 67 MMHG | TEMPERATURE: 96.8 F | RESPIRATION RATE: 16 BRPM

## 2020-09-03 PROCEDURE — 74011250636 HC RX REV CODE- 250/636: Performed by: STUDENT IN AN ORGANIZED HEALTH CARE EDUCATION/TRAINING PROGRAM

## 2020-09-03 PROCEDURE — 74011000250 HC RX REV CODE- 250: Performed by: REGISTERED NURSE

## 2020-09-03 PROCEDURE — 76060000031 HC ANESTHESIA FIRST 0.5 HR: Performed by: INTERNAL MEDICINE

## 2020-09-03 PROCEDURE — 76040000025: Performed by: INTERNAL MEDICINE

## 2020-09-03 PROCEDURE — 74011250636 HC RX REV CODE- 250/636: Performed by: REGISTERED NURSE

## 2020-09-03 PROCEDURE — 87635 SARS-COV-2 COVID-19 AMP PRB: CPT

## 2020-09-03 RX ORDER — SODIUM CHLORIDE, SODIUM LACTATE, POTASSIUM CHLORIDE, CALCIUM CHLORIDE 600; 310; 30; 20 MG/100ML; MG/100ML; MG/100ML; MG/100ML
100 INJECTION, SOLUTION INTRAVENOUS CONTINUOUS
Status: DISCONTINUED | OUTPATIENT
Start: 2020-09-03 | End: 2020-09-03 | Stop reason: HOSPADM

## 2020-09-03 RX ORDER — LIDOCAINE HYDROCHLORIDE 10 MG/ML
0.1 INJECTION INFILTRATION; PERINEURAL AS NEEDED
Status: DISCONTINUED | OUTPATIENT
Start: 2020-09-03 | End: 2020-09-03 | Stop reason: HOSPADM

## 2020-09-03 RX ORDER — SODIUM CHLORIDE 0.9 % (FLUSH) 0.9 %
5-40 SYRINGE (ML) INJECTION EVERY 8 HOURS
Status: DISCONTINUED | OUTPATIENT
Start: 2020-09-03 | End: 2020-09-03 | Stop reason: HOSPADM

## 2020-09-03 RX ORDER — SODIUM CHLORIDE 0.9 % (FLUSH) 0.9 %
5-40 SYRINGE (ML) INJECTION AS NEEDED
Status: DISCONTINUED | OUTPATIENT
Start: 2020-09-03 | End: 2020-09-03 | Stop reason: HOSPADM

## 2020-09-03 RX ORDER — PROPOFOL 10 MG/ML
INJECTION, EMULSION INTRAVENOUS
Status: DISCONTINUED | OUTPATIENT
Start: 2020-09-03 | End: 2020-09-03 | Stop reason: HOSPADM

## 2020-09-03 RX ORDER — LIDOCAINE HYDROCHLORIDE 20 MG/ML
INJECTION, SOLUTION EPIDURAL; INFILTRATION; INTRACAUDAL; PERINEURAL AS NEEDED
Status: DISCONTINUED | OUTPATIENT
Start: 2020-09-03 | End: 2020-09-03 | Stop reason: HOSPADM

## 2020-09-03 RX ORDER — PROPOFOL 10 MG/ML
INJECTION, EMULSION INTRAVENOUS AS NEEDED
Status: DISCONTINUED | OUTPATIENT
Start: 2020-09-03 | End: 2020-09-03 | Stop reason: HOSPADM

## 2020-09-03 RX ADMIN — PROPOFOL 140 MCG/KG/MIN: 10 INJECTION, EMULSION INTRAVENOUS at 10:43

## 2020-09-03 RX ADMIN — SODIUM CHLORIDE, SODIUM LACTATE, POTASSIUM CHLORIDE, AND CALCIUM CHLORIDE 100 ML/HR: 600; 310; 30; 20 INJECTION, SOLUTION INTRAVENOUS at 10:40

## 2020-09-03 RX ADMIN — PROPOFOL 20 MG: 10 INJECTION, EMULSION INTRAVENOUS at 10:45

## 2020-09-03 RX ADMIN — PROPOFOL 30 MG: 10 INJECTION, EMULSION INTRAVENOUS at 10:44

## 2020-09-03 RX ADMIN — LIDOCAINE HYDROCHLORIDE 60 MG: 20 INJECTION, SOLUTION EPIDURAL; INFILTRATION; INTRACAUDAL; PERINEURAL at 10:43

## 2020-09-03 RX ADMIN — PROPOFOL 10 MG: 10 INJECTION, EMULSION INTRAVENOUS at 10:46

## 2020-09-03 NOTE — ANESTHESIA PREPROCEDURE EVALUATION
Anesthetic History   No history of anesthetic complications            Review of Systems / Medical History  Patient summary reviewed and pertinent labs reviewed    Pulmonary    COPD: mild        Asthma : well controlled       Neuro/Psych         TIA and psychiatric history     Cardiovascular    Hypertension: well controlled        Dysrhythmias : atrial fibrillation  CAD and cardiac stents    Exercise tolerance: <4 METS  Comments: 7/2017 TTE: EF >55%; Mild pulm HTN    2020: Summary     The Left Ventricle is normal in size. There is no LV Thrombus seen. There   is mild concentric Left Ventricular Hypertrophy. Left Ventricular Systolic   function is normal. EF= > 55% . No regional wall motion abnormalities   noted. Diastolic dysfunction, grade 2. There is pericardial thickening and/or a small pericardial effusion. There   are no echocardiographic indications of cardiac tamponade. Mitral valve leaflets appear thickened but open adequately. There is mild   to moderate mitral regurgitation. Doppler findings do not suggest pulmonary hypertension. GI/Hepatic/Renal     GERD: well controlled    Renal disease: CRI  PUD and liver disease (liver transplant)     Endo/Other    Diabetes: well controlled, type 2         Other Findings   Comments: Dysphagia. Denies any angina, sob. Physical Exam    Airway  Mallampati: II  TM Distance: 4 - 6 cm  Neck ROM: normal range of motion   Mouth opening: Normal     Cardiovascular    Rhythm: regular  Rate: normal         Dental    Dentition: Full upper dentures  Comments: Upper implants.  Very poor dentition on lowers with some loose teeth in front   Pulmonary  Breath sounds clear to auscultation               Abdominal  GI exam deferred       Other Findings            Anesthetic Plan    ASA: 3  Anesthesia type: total IV anesthesia          Induction: Intravenous  Anesthetic plan and risks discussed with: Patient

## 2020-09-03 NOTE — ROUTINE PROCESS
VSS. No complaints noted. Education given and reviewed with son. Pt wheeled out to car via wheelchair by [facility].

## 2020-09-03 NOTE — DISCHARGE INSTRUCTIONS
Gastrointestinal Esophagogastroduodenoscopy (EGD) - Upper Exam Discharge Instructions    1. Call Dr. Margy Mast at [phone] for any problems or questions. 2. Contact the doctor's office for follow up appointment as directed. 3. Medication may cause drowsiness for several hours, therefore:  · Do not drive or operate machinery for remainder of the day. · No alcohol today. · Do not make any important or legal decisions for 24 hours. · Do not sign any legal documents for 24 hours. 5. Ordinarily, you may resume regular diet and activity after exam unless otherwise              specified by your physician. 6. For mild soreness in your throat you may use Cepacol throat lozenges or warm               salt-water gargles as needed. Any additional instructions:  ***     Instructions given to Julian Kendallyan and other family members.

## 2020-09-03 NOTE — ANESTHESIA POSTPROCEDURE EVALUATION
Procedure(s):  ESOPHAGOGASTRODUODENOSCOPY (EGD) WITH DILATION/ 19   ESOPHAGEAL DILATION. total IV anesthesia    Anesthesia Post Evaluation      Multimodal analgesia: multimodal analgesia used between 6 hours prior to anesthesia start to PACU discharge  Patient location during evaluation: bedside  Patient participation: complete - patient participated  Level of consciousness: awake and alert  Pain management: adequate  Airway patency: patent  Anesthetic complications: no  Cardiovascular status: acceptable  Respiratory status: acceptable  Hydration status: acceptable  Post anesthesia nausea and vomiting:  controlled  Final Post Anesthesia Temperature Assessment:  Normothermia (36.0-37.5 degrees C)      INITIAL Post-op Vital signs:   Vitals Value Taken Time   /65 9/3/2020 11:15 AM   Temp     Pulse 48 9/3/2020 11:18 AM   Resp 16 9/3/2020 11:05 AM   SpO2 100 % 9/3/2020 11:18 AM   Vitals shown include unvalidated device data.

## 2020-09-03 NOTE — H&P
Gastroenterology Outpatient History and Physical    Patient: Gena Kurt    Physician: Nate Hills MD    Chief Complaint: Dysphagia    History of Present Illness: Dyspepsia    Justification for Procedure: As above    History:  Past Medical History:   Diagnosis Date    A-fib [facility])     Adverse effect of anesthesia     overly sedated with epidural with surgery march- 2015, swelling, difficult awakening    Anemia     Asthma     inhaler    B12 deficiency     Bone/cartilage disorder     CAD (coronary artery disease)     cardiac stents x 2- 15 years ago per patient    [facility] [address] [address]     Chronic ischemic heart disease     Chronic kidney disease     small ureters, CKF    Chronic nausea     Chronic pain     back    Chronic UTI     Cirrhosis ([facility] [address]     patient states never had cirrhosis    CKD (chronic kidney disease)     [facility]     thylocemia b-minor    COPD (chronic obstructive pulmonary disease) (HCC)     patients denies COPD    Diabetes (HCC)     insulin- avg-150-170,hypo symptoms in 80s, does not know last A1C    GERD (gastroesophageal reflux disease)     managed with medications    Glaucoma     Hemochromatosis carrier     Hernia     Hyperlipidemia     Hypertension     Hypomagnesemia     IBS (irritable bowel syndrome)     Incontinence of urine in female     Liver transplanted ([facility] [address] 2001    Lumbago     Osteoporosis     PUD (peptic ulcer disease)     Restless leg syndrome     Splenomegaly     Stroke [facility])     states told she might have had mini stroke-no residual/states tests \"didn't show anything\"    Ventral hernia, recurrent       Past Surgical History:   Procedure Laterality Date    CARDIAC SURG PROCEDURE UNLIST      3 stents    HX APPENDECTOMY      HX CATARACT REMOVAL Bilateral     HX HERNIA REPAIR Right     total of 6    HX HYSTERECTOMY      HX LIVER TRANSPLANT      HX OPEN CHOLECYSTECTOMY      HX OTHER SURGICAL  07/2013    basal cell carcinoma excision    HX TOTAL ABDOMINAL HYSTERECTOMY      HX TRANSPLANT  2001    Liver transplant      Social History     Socioeconomic History    Marital status:      Spouse name: Not on file    Number of children: Not on file    Years of education: Not on file    Highest education level: Not on file   Tobacco Use    Smoking status: Never Smoker    Smokeless tobacco: Never Used   Substance and Sexual Activity    Alcohol use: No    Drug use: No      Family History   Problem Relation Age of Onset    Cancer Mother         mouth    Liver Disease Brother     Diabetes Sister     Arthritis-osteo Sister     Heart Disease Brother        Allergies: Allergies   Allergen Reactions    Adhesive Swelling     At site-local- can use paper tape    Bactroban [Mupirocin Calcium] Unknown (comments)    Benadryl [Diphenhydramine Hcl] Hives    Iodinated Contrast Media Shortness of Breath    Iohexol Unknown (comments)    Macrobid [Nitrofurantoin Monohyd/M-Cryst] Unknown (comments)    Metoclopramide Hcl Other (comments)    Other Medication Other (comments)     Tape \"takes my skin off\", epidural that patient had when hospitalized in March 2015    Reglan [Metoclopramide] Other (comments)     Mouth twitches       Medications:   Prior to Admission medications    Medication Sig Start Date End Date Taking? Authorizing Provider   pantoprazole (PROTONIX) 20 mg tablet Take 20 mg by mouth daily. Yes Provider, Historical   insulin degludec (TRESIBA FLEXTOUCH U-100 SC) 12-15 Units by SubCUTAneous route nightly. Yes Provider, Historical   aspirin (ASPIRIN) 325 mg tablet Take 325 mg by mouth daily (after lunch). Per Dr. Carey Iron take 81 mg if available   Yes Provider, Historical   EASY TOUCH 32 gauge x 5/32\" ndle  12/4/19  Yes Provider, Historical   metoprolol tartrate (LOPRESSOR) 50 mg tablet Take 0.5 Tabs by mouth two (2) times a day.  3/4/20  Yes Adriana Pop MD   folic acid (FOLVITE) 1 mg tablet TAKE 1 TABLET BY MOUTH ONCE DAILY 10/7/19  Yes Mali Vela MD   mycophenolate sodium (MYFORTIC) 360 mg delayed release tablet Take  by mouth two (2) times a day. Take 2 tab twice a day   Indications: Liver Transplant   Yes Provider, Historical   alcaftadine (LASTACAFT) 0.25 % drop nightly. 7/6/15  Yes Provider, Historical   insulin aspart U-100 (NOVOLOG) 100 unit/mL (3 mL) inpn 10-12 Units by SubCUTAneous route Before breakfast, lunch, and dinner. Yes Provider, Historical   bimatoprost (LUMIGAN) 0.01 % ophthalmic drops Administer 1 Drop to both eyes nightly. Yes Provider, Historical   oxyCODONE IR (ROXICODONE) 10 mg tab immediate release tablet Take 10 mg by mouth two (2) times a day. Yes Provider, Historical   sodium bicarbonate 325 mg tablet TAKE 2 TABLETS BY MOUTH THREE TIMES A DAY 11/18/18  Yes Gaikwad, Nitin Glorianne Opitz, MD   PROAIR HFA 90 mcg/actuation inhaler INHALE 2 PUFFS BY MOUTH EVERY 4 HOURS 9/13/17  Yes Randell Cifuentes MD   rivaroxaban (Xarelto) 20 mg tab tablet Take  by mouth daily. Holding for procedure- 8/25/20 last dose per patient    Provider, Historical       Vital Signs: There were no vitals taken for this visit.     Physical Exam:   General: alert      Heart: regular rate and rhythm   Lungs: no tachypnea, retractions or cyanosis, Heart exam - S1, S2 normal, no murmur, no gallop, rate regular   Abdominal: Bowel sounds are normal, soft, non distended             Plan of Care/Planned Procedure: EGD    Signed:  Pearle Mcardle, MD 9/3/2020

## 2020-09-03 NOTE — PROCEDURES
EGD Procedure Note    PreOp Diagnosis:   Dysphagia   GERD     PostOp Diagnosis:  Esophagitis - Moderate   Esophageal dyskinesia     Medications:  Monitored Anesthesia    Procedure:  EGD   Instrument:   After informed consent was obtained, the patient was sedated and the endoscope was inserted  in the mouth and advanced into the duodenum without difficulty. The scope was slowly withdrawn while the mucosa was carefully inspected, including a retroflexed view of the cardia and fundus. Findings:   OROPHARYNX: The piriform sinuses, arytenoid cartilage and vocal cords were briefly evaluated on entry and withdrawal of the endoscope through the oropharynx. These were found to be unremarkable. ESOPHAGUS: The proximal and mid esophagus appeared normal.  Esophagitis was identified in the distal esophagus, consistent with changes of acid reflux. This was LA class ' B'. There was no evidence of Humphreys's type intestinal metaplasia. Empiric Bowles esophageal dilation was performed in standard fashion with a 44 and 48 Western Mariah dilator. There was no evidence of complications. STOMACH: The gastric mucosa was unremarkable throughout. There were no erosions, ulcerations, polyps, mass lesions, vascular ectasias or other abnormalities noted. The pylorus was patent and easily intubated. There was no hiatal hernia noted by direct view and retroflexion within the stomach. DUODENUM: The endoscope was advanced as far as possible into the duodenum. The villi appeared normal.  There were no mucosal breaks, erosions, ulcerations, vascular ectasias or other abnormalities present.      Recommendations:  Reflux diet   Resume Protonix   Routine office followup with Dr. White Can   Repeat esophageal dilation as needed    Farrah Bass MD

## 2020-09-04 LAB
COVID-19 RAPID TEST, COVR: NOT DETECTED
SARS COV-2, XPGCVT: NEGATIVE
SOURCE, COVRS: NORMAL

## 2020-11-03 NOTE — ANESTHESIA PREPROCEDURE EVALUATION
Anesthetic History   No history of anesthetic complications            Review of Systems / Medical History  Patient summary reviewed and pertinent labs reviewed    Pulmonary    COPD: mild        Asthma : well controlled       Neuro/Psych         TIA and psychiatric history     Cardiovascular    Hypertension        Dysrhythmias : atrial fibrillation  CAD and cardiac stents    Exercise tolerance: <4 METS     GI/Hepatic/Renal     GERD    Renal disease: CRI  Liver disease (liver transplant)     Endo/Other    Diabetes: well controlled, type 2         Other Findings              Physical Exam    Airway  Mallampati: II  TM Distance: 4 - 6 cm  Neck ROM: normal range of motion   Mouth opening: Normal     Cardiovascular    Rhythm: regular  Rate: normal         Dental    Dentition: Full upper dentures  Comments: Upper implants.  Very poor dentition on lowers with some loose teeth in front   Pulmonary  Breath sounds clear to auscultation               Abdominal         Other Findings            Anesthetic Plan    ASA: 3  Anesthesia type: total IV anesthesia          Induction: Intravenous  Anesthetic plan and risks discussed with: Patient and Family Report called to RN receiving patient into room [phone]. Family updated in family unge.

## 2020-11-05 ENCOUNTER — PATIENT OUTREACH (OUTPATIENT)
Dept: CASE MANAGEMENT | Age: 70
End: 2020-11-05

## 2020-11-05 NOTE — PROGRESS NOTES
RNCM attempted to reach pt regarding CCM services, no answer and no name on vm. Will continue attempts to reach pt.

## 2020-11-12 ENCOUNTER — PATIENT OUTREACH (OUTPATIENT)
Dept: CASE MANAGEMENT | Age: 70
End: 2020-11-12

## 2020-11-12 NOTE — PROGRESS NOTES
RNCM 2nd unsuccessful attempt to reach pt regarding CCM services, no answer and no vm set up. Will attempt again and close if unable to be reached.

## 2021-09-04 LAB
AVERAGE GLUCOSE: NORMAL
HBA1C MFR BLD: 6 %

## 2022-03-18 PROBLEM — I47.1 SVT (SUPRAVENTRICULAR TACHYCARDIA) (HCC): Status: ACTIVE | Noted: 2020-02-18

## 2022-03-18 PROBLEM — Z98.890 OTHER SPECIFIED POSTPROCEDURAL STATES: Status: ACTIVE | Noted: 2020-03-04

## 2022-03-18 PROBLEM — R19.7 DIARRHEA IN ADULT PATIENT: Status: ACTIVE | Noted: 2020-03-04

## 2022-03-18 PROBLEM — R14.0 ABDOMINAL BLOATING: Status: ACTIVE | Noted: 2020-03-04

## 2022-03-18 PROBLEM — F32.A DEPRESSION: Status: ACTIVE | Noted: 2019-05-29

## 2022-03-18 PROBLEM — R10.33 PERIUMBILICAL ABDOMINAL PAIN: Status: ACTIVE | Noted: 2020-03-04

## 2022-03-19 PROBLEM — Z79.01 LONG TERM (CURRENT) USE OF ANTICOAGULANTS: Status: ACTIVE | Noted: 2020-03-04

## 2022-03-19 PROBLEM — E11.21 TYPE 2 DIABETES WITH NEPHROPATHY (HCC): Status: ACTIVE | Noted: 2020-03-04

## 2022-03-19 PROBLEM — R13.10 DYSPHAGIA: Status: ACTIVE | Noted: 2020-02-17

## 2022-03-19 PROBLEM — K21.9 GASTRO-ESOPHAGEAL REFLUX DISEASE WITHOUT ESOPHAGITIS: Status: ACTIVE | Noted: 2020-03-04

## 2022-07-31 NOTE — PROGRESS NOTES
[facility] outreach to home/cell number; no answer. Chart review: left without being seen at endo appointment, canceled PCP appointment. Has rescheduled PCP appointment to 5/28. No endo appointment. Plan: f/u with patient to determine willingness to follow through with endo referral. Also needs appointments with dietitian and ENT. 31-Jul-2022

## (undated) DEVICE — CANNULA NSL ORAL AD FOR CAPNOFLEX CO2 O2 AIRLFE

## (undated) DEVICE — KENDALL RADIOLUCENT FOAM MONITORING ELECTRODE RECTANGULAR SHAPE: Brand: KENDALL

## (undated) DEVICE — BLOCK BITE AD 60FR W/ VELC STRP ADDRESSES MOST PT AND

## (undated) DEVICE — CONNECTOR TBNG OD5-7MM O2 END DISP

## (undated) DEVICE — CONTAINER PREFIL FRMLN 40ML --

## (undated) DEVICE — FORCEPS BX L240CM JAW DIA2.8MM L CAP W/ NDL MIC MESH TOOTH

## (undated) DEVICE — ESOPHAGEAL/PYLORIC/COLONIC/BILIARY WIREGUIDED BALLOON DILATATION CATHETER: Brand: CRE™ PRO

## (undated) DEVICE — ESOPHAGEAL BALLOON DILATATION CATHETER: Brand: CRE FIXED WIRE

## (undated) DEVICE — BRUSH CYTO L230CM OD2.4MM FOR COLONSCP